# Patient Record
Sex: FEMALE | Race: WHITE
[De-identification: names, ages, dates, MRNs, and addresses within clinical notes are randomized per-mention and may not be internally consistent; named-entity substitution may affect disease eponyms.]

---

## 2017-02-03 ENCOUNTER — HOSPITAL ENCOUNTER (EMERGENCY)
Dept: HOSPITAL 25 - UCCORT | Age: 27
Discharge: HOME | End: 2017-02-03
Payer: COMMERCIAL

## 2017-02-03 VITALS — SYSTOLIC BLOOD PRESSURE: 144 MMHG | DIASTOLIC BLOOD PRESSURE: 74 MMHG

## 2017-02-03 DIAGNOSIS — Z88.1: ICD-10-CM

## 2017-02-03 DIAGNOSIS — J06.9: Primary | ICD-10-CM

## 2017-02-03 PROCEDURE — 99212 OFFICE O/P EST SF 10 MIN: CPT

## 2017-02-03 PROCEDURE — G0463 HOSPITAL OUTPT CLINIC VISIT: HCPCS

## 2017-02-03 NOTE — UC
Respiratory Complaint HPI





- HPI Summary


HPI Summary: 





The patient comes in today for:





1.  Cough, sore throat, chest pain:





Onset:  Yesterday.


Palliative/provocative:  Eating cough drops helps. 


Quality:  B[place]ing,


Region: [place].


Severity:  8/10 with coughing.


Time: Cough lasts a few seconds. 


Associated symptoms:


   Chest pain:  A pressure of the chest, but she states that it is only present 

with coughing.  


   Fevers: None.


   Cough: clear mucous.


   Rhinitis:  None.


   Sore throat is present, but no known strep exposure. 


*











- History of Current Complaint


Chief Complaint: UCRespiratory


Stated Complaint: COUGH,CONGESTION


Time Seen by Provider: 02/03/17 08:19


Hx Obtained From: Patient


Hx Last Menstrual Period: Nexplanon


Pregnant?: No





- Allergies/Home Medications


Allergies/Adverse Reactions: 


 Allergies











Allergy/AdvReac Type Severity Reaction Status Date / Time


 


Amoxicillin Allergy  Rash Verified 02/03/17 08:16


 


Sulfamethoxazole Allergy  Rash Verified 02/03/17 08:16





w/Trimethoprim     





[From Bactrim]     











Home Medications: 


 Home Medications





Acetaminophen 1,000 mg PO Q6H PRN 02/03/17 [History Confirmed 02/03/17]


Nexplanon  ONCE 02/03/17 [History]


Pseudoephedrine HCL ER TAB* [Sudafed 12 Hour*] 120 mg PO BID PRN 02/03/17 [

History Confirmed 02/03/17]











PMH/Surg Hx/FS Hx/Imm Hx


Previously Healthy: Yes


Endocrine History Of: 


   Denies: Diabetes, Thyroid Disease, Hyperthyroidism, Hypothyroidism, 

Dyslipidemia


Cardiovascular History Of: 


   Denies: Cardiac Disorders, Hypertension, Pacemaker/ICD, Myocardial Infarction

, Congestive Heart Failure, Atrial Fibrillation, Deep Vein Thrombosis, Bleeding 

Disorders


Respiratory History Of: 


   Denies: COPD, Asthma, Bronchitis, Pneumonia, Pulmonary Embolism


GI/ History Of: 


   Denies: Gastroesophageal Reflux, Ulcer, Gastrointestinal Bleed, Gall Bladder 

Disease, Kidney Stones, Diverticulitis, Renal Disease, Urosepsis


Neurological History Of: 


   Denies: TIA, CVA, Dementia, Seizures, Migraine


Psychological History Of: 


   Denies: Anxiety, Depression, Bipolar Disorder, Schizophrenia, Post Traumatic 

Stress Disorder


Cancer History Of: 


   Denies: Lung Cancer, Colorectal Cancer, Breast Cancer, Prostate Cancer, 

Cervical Cancer


Other History Of: 


   Negative For: HIV, Hepatitis B, Hepatitis C, Anticoagulant Therapy





- Surgical History


Surgical History: None





- Family History


Known Family History: Positive: Hypertension


   Negative: Cardiac Disease





- Social History


Occupation: Employed Full-time


Alcohol Use: Rare


Substance Use Type: None


Smoking Status (MU): Never Smoked Tobacco





- Immunization History


Most Recent Influenza Vaccination: 2016





Review of Systems


Constitutional: Negative


Skin: Negative


Eyes: Negative


ENT: Sore Throat


Respiratory: Cough


Cardiovascular: Chest Pain


Gastrointestinal: Negative


Genitourinary: Negative


All Other Systems Reviewed And Are Negative: Yes





Physical Exam


Triage Information Reviewed: Yes


Appearance: Well-Appearing, No Pain Distress, Well-Nourished


Vital Signs: 


 Initial Vital Signs











Temp  99.4 F   02/03/17 08:18


 


Pulse  111   02/03/17 08:18


 


Resp  18   02/03/17 08:18


 


BP  144/74   02/03/17 08:18


 


Pulse Ox  100   02/03/17 08:18











Vital Signs Reviewed: Yes


Eyes: Positive: Conjunctiva Clear.  Negative: Discharge


ENT: Positive: Hearing grossly normal.  Negative: Pharyngeal erythema, Nasal 

congestion, Nasal drainage, TM bulging, TM dull, TM red, Tonsillar swelling, 

Tonsillar exudate


Dental: Negative: Gross Decay/Caries @, Dental Fracture @


Neck: Positive: Supple, Nontender, No Lymphadenopathy.  Negative: Nuchal 

Rigidity


Respiratory: Positive: Chest non-tender, Lungs clear, No respiratory distress, 

No accessory muscle use.  Negative: Crackles, Wheezing, Expiration


Cardiovascular: Positive: RRR, No Murmur


Abdomen Description: Positive: Nontender, No Organomegaly, Soft.  Negative: 

Distended, Guarding


Musculoskeletal: Positive: Strength Intact, ROM Intact, No Edema


Neurological: Positive: Alert, Muscle Tone Normal


Psychological: Positive: Age Appropriate Behavior, Consolable


Skin: Negative: rashes, breakdown





UC Diagnostic Evaluation





- Laboratory


O2 Sat by Pulse Oximetry: 100





Respiratory Course/Dx





- Course


Course Of Treatment: The patient was told that we are not set up well to 

evaluate chest pain, and she quickly stated that her chest pain is only related 

to her upper airways--and only a problem when she coughs.





- Differential Dx/Diagnosis


Provider Diagnoses: Upper viral respiratory infection.





Discharge





- Discharge Plan


Condition: Stable


Disposition: HOME


Patient Education Materials:  Upper Respiratory Infection (ED)


Referrals: 


Francisco J Rashid MD [Primary Care Provider] - 1 Week


(Please see your primary care provider in about a week to see how well you are 

doing.  


If you get worse, please be seen sooner.)

## 2017-02-07 ENCOUNTER — HOSPITAL ENCOUNTER (EMERGENCY)
Dept: HOSPITAL 25 - UCCORT | Age: 27
Discharge: LEFT BEFORE DISCHARGE | End: 2017-02-07
Payer: COMMERCIAL

## 2017-02-07 DIAGNOSIS — R05: Primary | ICD-10-CM

## 2017-02-07 DIAGNOSIS — Z53.21: ICD-10-CM

## 2017-02-07 PROCEDURE — 99211 OFF/OP EST MAY X REQ PHY/QHP: CPT

## 2017-02-07 PROCEDURE — G0463 HOSPITAL OUTPT CLINIC VISIT: HCPCS

## 2019-03-10 ENCOUNTER — HOSPITAL ENCOUNTER (EMERGENCY)
Dept: HOSPITAL 25 - UCCORT | Age: 29
Discharge: HOME | End: 2019-03-10
Payer: COMMERCIAL

## 2019-03-10 VITALS — SYSTOLIC BLOOD PRESSURE: 129 MMHG | DIASTOLIC BLOOD PRESSURE: 71 MMHG

## 2019-03-10 DIAGNOSIS — J32.9: Primary | ICD-10-CM

## 2019-03-10 DIAGNOSIS — Z88.2: ICD-10-CM

## 2019-03-10 DIAGNOSIS — E66.9: ICD-10-CM

## 2019-03-10 DIAGNOSIS — Z88.0: ICD-10-CM

## 2019-03-10 PROCEDURE — 99212 OFFICE O/P EST SF 10 MIN: CPT

## 2019-03-10 PROCEDURE — G0463 HOSPITAL OUTPT CLINIC VISIT: HCPCS

## 2019-03-10 NOTE — UC
Throat Pain/Nasal Jose HPI





- HPI Summary


HPI Summary: 





Since Friday, nasal congestion and pressure. No fever. No dental pain. There is 

no prior sinus surgery. 





- History of Current Complaint


Chief Complaint: UCRespiratory


Stated Complaint: SINUSES


Time Seen by Provider: 03/10/19 10:53


Hx Obtained From: Patient


Hx Last Menstrual Period: 2/28/19


Onset/Duration: Gradual Onset, Lasting Days


Severity: Moderate


Pain Intensity: 6


Cough: None


Associated Signs & Symptoms: Positive: Sinus Discomfort, Nasal Discharge.  

Negative: Dysphagia, FB Sensation, Drooling, Fever, Vomiting, Rash





- Allergies/Home Medications


Allergies/Adverse Reactions: 


 Allergies











Allergy/AdvReac Type Severity Reaction Status Date / Time


 


amoxicillin Allergy  Rash Verified 03/10/19 10:21


 


Sulfa (Sulfonamide Allergy  Rash Verified 03/10/19 10:21





Antibiotics)     











Home Medications: 


 Home Medications





Dm/P-Ephed/Acetaminoph/Doxylam [[place] Plus Severe 10-12.5- mg] 1 

pow PO 03/10/19 [History]











PMH/Surg Hx/FS Hx/Imm Hx


Previously Healthy: No - sinusitis in the past. 


Other History Of: 


   Negative For: HIV, Hepatitis B, Hepatitis C, Anticoagulant Therapy





- Surgical History


Surgical History: None





- Family History


Known Family History: Positive: None, Hypertension


   Negative: Cardiac Disease





- Social History


Lives: With Family


Alcohol Use: Rare


Substance Use Type: None


Smoking Status (MU): Never Smoked Tobacco





- Immunization History


Most Recent Influenza Vaccination: 2016





Review of Systems


All Other Systems Reviewed And Are Negative: Yes


ENT: Positive: Sinus Congestion, Sinus Pain/Tenderness





Physical Exam


Triage Information Reviewed: Yes


Appearance: Well-Appearing, No Pain Distress, Obese


Vital Signs: 


 Initial Vital Signs











Temp  97.7 F   03/10/19 10:17


 


Pulse  89   03/10/19 10:17


 


Resp  18   03/10/19 10:17


 


BP  129/71   03/10/19 10:17


 


Pulse Ox  98   03/10/19 10:17











Vital Signs Reviewed: Yes


Eyes: Positive: Conjunctiva Clear.  Negative: Conjunctiva Inflamed


ENT: Positive: Hearing grossly normal, Pharynx normal, Nasal congestion, TMs 

normal, Sinus tenderness, Uvula midline.  Negative: Pharyngeal erythema, Nasal 

drainage, TM bulging, TM dull, TM red, Tonsillar swelling, Tonsillar exudate, 

Trismus


Neck: Positive: Supple, Nontender, No Lymphadenopathy


Respiratory: Positive: Lungs clear, Normal breath sounds, No respiratory 

distress, No accessory muscle use.  Negative: Respiratory distress, Decreased 

breath sounds, Accessory muscle use, Crackles, Rhonchi, Stridor, Wheezing


Cardiovascular: Positive: No Murmur, Pulses Normal, Brisk Capillary Refill


Abdomen Description: Positive: No Organomegaly, Soft.  Negative: Distended, 

Guarding


Musculoskeletal: Positive: Strength Intact, ROM Intact, No Edema


Neurological: Positive: Alert, Muscle Tone Normal.  Negative: Fatigued


Psychological: Positive: Age Appropriate Behavior


Skin: Negative: Rashes





Throat Pain/Nasal Course/Dx





- Differential Dx/Diagnosis


Provider Diagnosis: 


 Sinusitis








Discharge





- Sign-Out/Discharge


Documenting (check all that apply): Patient Departure


All imaging exams completed and their final reports reviewed: No Studies





- Discharge Plan


Condition: Good


Disposition: HOME


Prescriptions: 


Cefdinir SUSP* ORALSYR [Omnicef SUSP*] 300 mg PO BID #20 ml


Patient Education Materials:  Sinusitis (ED)


Referrals: 


Varsha Chandler MD [Primary Care Provider] - 


Additional Instructions: 


Flonase bid OTC for 10 days and nasal irrigation or netti pots twice a day and 

if not better in 5-6 days then antibiotics or if you get a fever or sudden 

worsening of pain. 





- Billing Disposition and Condition


Condition: GOOD


Disposition: Home